# Patient Record
Sex: FEMALE | Race: WHITE | NOT HISPANIC OR LATINO | URBAN - METROPOLITAN AREA
[De-identification: names, ages, dates, MRNs, and addresses within clinical notes are randomized per-mention and may not be internally consistent; named-entity substitution may affect disease eponyms.]

---

## 2021-02-19 ENCOUNTER — OUTPATIENT (OUTPATIENT)
Dept: OUTPATIENT SERVICES | Facility: HOSPITAL | Age: 56
LOS: 1 days | Discharge: HOME | End: 2021-02-19

## 2021-02-19 DIAGNOSIS — Z02.9 ENCOUNTER FOR ADMINISTRATIVE EXAMINATIONS, UNSPECIFIED: ICD-10-CM

## 2021-03-02 ENCOUNTER — OUTPATIENT (OUTPATIENT)
Dept: OUTPATIENT SERVICES | Facility: HOSPITAL | Age: 56
LOS: 1 days | Discharge: HOME | End: 2021-03-02
Payer: COMMERCIAL

## 2021-03-02 DIAGNOSIS — R92.8 OTHER ABNORMAL AND INCONCLUSIVE FINDINGS ON DIAGNOSTIC IMAGING OF BREAST: ICD-10-CM

## 2021-03-02 PROCEDURE — 76642 ULTRASOUND BREAST LIMITED: CPT | Mod: 26,50

## 2021-03-02 PROCEDURE — 77066 DX MAMMO INCL CAD BI: CPT | Mod: 26

## 2021-03-02 PROCEDURE — G0279: CPT | Mod: 26

## 2021-03-15 ENCOUNTER — OUTPATIENT (OUTPATIENT)
Dept: OUTPATIENT SERVICES | Facility: HOSPITAL | Age: 56
LOS: 1 days | Discharge: HOME | End: 2021-03-15
Payer: COMMERCIAL

## 2021-03-15 ENCOUNTER — RESULT REVIEW (OUTPATIENT)
Age: 56
End: 2021-03-15

## 2021-03-15 DIAGNOSIS — N64.9 DISORDER OF BREAST, UNSPECIFIED: ICD-10-CM

## 2021-03-15 PROCEDURE — 19085 BX BREAST 1ST LESION MR IMAG: CPT | Mod: LT

## 2021-03-15 PROCEDURE — 19086 BX BREAST ADD LESION MR IMAG: CPT | Mod: RT

## 2021-03-15 PROCEDURE — 19082 BX BREAST ADD LESION STRTCTC: CPT | Mod: RT

## 2021-03-15 PROCEDURE — 19081 BX BREAST 1ST LESION STRTCTC: CPT | Mod: LT

## 2021-03-15 PROCEDURE — 88305 TISSUE EXAM BY PATHOLOGIST: CPT | Mod: 26

## 2021-03-16 LAB — SURGICAL PATHOLOGY STUDY: SIGNIFICANT CHANGE UP

## 2021-04-06 PROBLEM — Z00.00 ENCOUNTER FOR PREVENTIVE HEALTH EXAMINATION: Status: ACTIVE | Noted: 2021-04-06

## 2021-05-08 PROBLEM — Z86.79 HISTORY OF HYPERTENSION: Status: RESOLVED | Noted: 2021-05-08 | Resolved: 2021-05-08

## 2021-05-08 PROBLEM — Z87.09 HISTORY OF ASTHMA: Status: RESOLVED | Noted: 2021-05-08 | Resolved: 2021-05-08

## 2021-05-08 PROBLEM — Z80.3 FAMILY HISTORY OF BREAST CANCER: Status: ACTIVE | Noted: 2021-05-08

## 2021-05-08 PROBLEM — Z78.9 USES ALCOHOL OCCASIONALLY: Status: ACTIVE | Noted: 2021-05-08

## 2021-05-08 RX ORDER — LOSARTAN POTASSIUM 100 MG/1
TABLET, FILM COATED ORAL
Refills: 0 | Status: ACTIVE | COMMUNITY

## 2021-05-08 RX ORDER — FLUTICASONE FUROATE AND VILANTEROL TRIFENATATE 100; 25 UG/1; UG/1
100-25 POWDER RESPIRATORY (INHALATION)
Refills: 0 | Status: ACTIVE | COMMUNITY

## 2021-05-10 ENCOUNTER — APPOINTMENT (OUTPATIENT)
Dept: BREAST CENTER | Facility: CLINIC | Age: 56
End: 2021-05-10
Payer: COMMERCIAL

## 2021-05-10 VITALS
WEIGHT: 180 LBS | TEMPERATURE: 98.7 F | SYSTOLIC BLOOD PRESSURE: 124 MMHG | HEIGHT: 60 IN | BODY MASS INDEX: 35.34 KG/M2 | DIASTOLIC BLOOD PRESSURE: 78 MMHG

## 2021-05-10 DIAGNOSIS — Z86.79 PERSONAL HISTORY OF OTHER DISEASES OF THE CIRCULATORY SYSTEM: ICD-10-CM

## 2021-05-10 DIAGNOSIS — Z87.09 PERSONAL HISTORY OF OTHER DISEASES OF THE RESPIRATORY SYSTEM: ICD-10-CM

## 2021-05-10 DIAGNOSIS — Z78.9 OTHER SPECIFIED HEALTH STATUS: ICD-10-CM

## 2021-05-10 DIAGNOSIS — Z80.3 FAMILY HISTORY OF MALIGNANT NEOPLASM OF BREAST: ICD-10-CM

## 2021-05-10 PROCEDURE — 99203 OFFICE O/P NEW LOW 30 MIN: CPT

## 2021-05-10 PROCEDURE — 99072 ADDL SUPL MATRL&STAF TM PHE: CPT

## 2021-05-10 NOTE — DATA REVIEWED
[FreeTextEntry1] : EXAM: MG MAMMO DIAG W MARJORIE BI#\par EXAM: MG US BREAST LIMITED BI\par \par \par PROCEDURE DATE: 03/02/2021\par \par \par \par INTERPRETATION: Clinical History / Reason for exam: 55 years old patient for bilateral callback following screening mammogram at an outside facility on 1/29/2021. An asymmetry was noted in the central aspect of the right breast on the CC image only and a 3 mm nodule in the upper inner left breast for which additional imaging was suggested.\par \par Last CBE: 1 month ago\par \par FAMILY HISTORY OF BREAST CANCER: Maternal grandmother at age 50 and mother at age 30 recommended\par \par TECHNIQUE: Bilateral CC spot compression images including full 90 degrees images with Tomosynthesis. Spot compression magnification images of the left breast in CC and 90 degrees projections. CAD was also utilized.\par \par COMPARISON: 1/29/2021 through 1/25/2013. Outside tomographic images are not available.\par \par BREAST COMPOSITION: The breasts are heterogeneously dense, which may obscure small masses.\par \par FINDINGS:\par \par MAMMOGRAM:\par There is a persistent 8 mm asymmetry in the right central to slightly inner breast, projecting in the lower breast on the Tomosynthesis slices.\par \par The questioned nodule in the left medial breast partially effaced on the additional spot compression images. 2 adjacent 3 to 4 mm nodular asymmetries are seen in the left medial breast with a few associated punctate calcifications. The findings are not very distinct on the lateral routine and magnification images.\par \par BREAST ULTRASOUND:\par Targeted bilateral breast ultrasound was performed. Attention areas of mammographic findings.\par \par No discrete suspicious solid cystic mass is noted.\par \par IMPRESSION:\par \par Additional right mammographic imaging demonstrates a sonographically occult 8 mm asymmetry in the central to slightly medial inferior breast. Stereotactic guided biopsy is suggested.\par \par The above finding may correspond to the mass noted in the right breast at 6:00 axis on MRI for which MRI guided biopsy is planned. The patient should undergo bilateral MRI guided biopsies as previously suggested. On the postprocedure mammogram, the position of the biopsy marker to be correlated with the mammographic asymmetry. If the MRI biopsy site does not correspond to the mammographic asymmetry or the MRI guided biopsy could not be performed due to nonvisualization, then the patient should be scheduled for stereotactic guided right breast biopsy.\par \par Additional left mammographic imaging demonstrates a couple of 3 to 4 mm probably benign sonographically occult adjacent nodular asymmetries with a few associated benign appearing punctate calcifications in the left medial breast. Short-term follow-up left mammogram is suggested in 6 months for stability. However, patient is requesting biopsy for definitive diagnosis. Stereotactic guided biopsy may be performed for one of the 2 adjacent asymmetries as they exhibit similar morphology. Management of the adjacent second asymmetry to be based upon the biopsy result.\par \par Recommendation: Stereotactic guided biopsy. The patient should undergo bilateral MRI guided biopsies first as discussed above. Stereotactic guided biopsy of the right breast to be performed if needed following the MRI guided biopsy. The patient is requesting left breast stereotactic guided biopsy as well.\par \par BI-RADS Category 4: Suspicious\par \par The findings and recommendations were discussed with the patient and her . The patient will be given appointments for the MRI guided and stereotactic guided biopsies.\par \par \par \par \par DEL DE LOS SANTOS MD; Attending Radiologist\par This document has been electronically signed. Mar 3 2021 3:45PM\par \par EXAM: MR BX BRST 1ST LT SISC\par EXAM: MR BX BRST 1ST RT SISC\par \par *** ADDENDUM 03/17/2021 ***\par \par Postprocedure patient follow-up and Pathology result:\par \par -Diagnosis:\par 1. BREAST, LEFT LATERAL ENHANCING MASS, MRI GUIDED NEEDLE CORE\par BIOPSIES:\par - BENIGN EARLY ATROPHIC FATTY BREAST TISSUE WITH PROLIFERATIVE\par TYPE FIBROCYSTIC CHANGES ASSOCIATED WITH RARE MICROCALCIFICATIONS\par PRESENT IN BENIGN DUCTULES.\par \par 2. BREAST, RIGHT MEDIAL ENHANCING MASS, MRI GUIDED NEEDLE CORE\par BIOPSIES:\par - BENIGN EARLY ATROPHIC FATTY BREAST TISSUE WITHOUT\par -The pathology results are concordant with the imaging findings.\par \par -Recommendation: Surgical consultation for the right breast intraductal papilloma.\par \par -No significant delayed complications.\par \par -Results were discussed with patient on 3/17/2021 at 11:[address] Dr. Richard via telephone with read back.\par \par \par \par *** END OF ADDENDUM 03/17/2021 ***\par \par \par \par PROCEDURE DATE: 03/15/2021\par \par \par \par \par INTERPRETATION: Clinical History / Reason for exam: MRI guided biopsy of the both breasts.\par \par Clinical indication: Bilateral breast masses.\par \par The procedure, its risks, benefits, and alternatives were explained to the patient, who expressed understanding and gave informed written and verbal consent, including consent for intravenous gadolinium. A time-out, which included the patient's full name, date of birth, description of the expected procedure and procedure site, was performed immediately before the procedure.\par \par Technique/Findings: MR imaging of the both breasts was performed using a 1.5 felecia GE magnet, dedicated 8 channel breast coil and [employer] platform. 7.5 cc of intravenous contrast were administered. IV access was obtained on site. The examination was performed with a biopsy grid in place. Sequences include sagittal dynamic fat suppressed pre and post contrast gradient echo imaging.\par \par Right breast medial:\par Using the usual sterile technique with 1% lidocaine as local anesthesia, a small dermatotomy was made in the skin. Under MR guidance, using a 9 gauge vacuum-assisted device, the previously described area in the right breast was sampled, with 6 core biopsies obtained. Post biopsy imaging in the sagittal plane demonstrated a hematoma at the biopsy site. A Hologic Cork biopsy clip was placed in the biopsy cavity.\par \par Left breast lateral:\par Using the usual sterile technique with 1% lidocaine as local anesthesia, a small dermatotomy was made in the skin. Under MR guidance, using a 9 gauge vacuum-assisted device, the previously described area in the left breast was sampled, with 6 core biopsies obtained. Post biopsy imaging in the sagittal plane demonstrated a hematoma at the biopsy site. A Hologic Cork biopsy clip was placed in the biopsy cavity.\par \par Hemostasis was maintained with manual pressure. A sterile dressing was applied.\par \par A bilateral mammogram was performed:\par VIEWS: CC and ML views of the both breasts.\par BREAST COMPOSITION: The breasts are heterogeneously dense, which may obscure small masses.\par FINDINGS: The biopsy clip placed during the MRI guided biopsy is in the anticipated location in both breasts.\par FINAL ASSESSMENT Category: Post Procedure Mammogram for Marker Placement\par \par The patient tolerated the procedure well and was discharged home in stable condition, with written discharge instructions.\par \par \par IMPRESSION:\par \par Successful MRI guided biopsy of the both breasts.\par \par Pathology: Pending, to be reported as an addendum.\par ***Please see the addendum at the top of this report. It may contain additional important information or changes.****\par \par \par \par MARIA L RICHARD DO; Attending Radiologist\par This document has been electronically signed. Mar 15 2021 3:27PM\par Addend:MARIA L RICHARD DO; Attending Radiologist\par This addendum was electronically signed on: Mar 17 2021 11:51AM\par \par EXAM: MG STEREO BX 1ST LT SISC\par EXAM: MG STEREO BX 1ST RT SISC\par \par *** ADDENDUM 03/17/2021 ***\par \par Postprocedure patient follow-up and Pathology result:\par \par -Diagnosis:\par 1. BREAST, RIGHT CENTRAL MASS, STEREOTACTIC CORE BIOPSIES:\par - INTRADUCTAL PAPILLOMA ASSOCIATED WITH EXTENSIVE\par CYSTIC/PAPILLARY APOCRINE METAPLASIA AND FOCAL FLORID DUCT\par HYPERPLASIA.\par - BENIGN ATROPHIC FATTY BREAST TISSUE WITH PROLIFERATIVE TYPE\par FIBROCYSTIC CHANGES ASSOCIATED WITH MICROCALCIFICATIONS.\par \par 2. BREAST, LEFT MEDIAL MASS, STEREOTACTIC CORE BIOPSIES:\par - BENIGN EARLY ATROPHIC FATTY BREAST TISSUE WITH PROLIFERATIVE\par TYPE FIBROCYSTIC CHANGES INCLUDING FLORID DUCT\par HYPERPLASIA AND CYSTIC/PAPILLARY APOCRINE METAPLASIA ASSOCIATED\par WITH CALCIUM OXALATE CRYSTALS.\par -The pathology results are concordant with the imaging findings.\par \par -Recommendation: Surgical consultation for the right breast in the ductal papilloma recommended.\par \par -No significant delayed complications.\par \par -Results were discussed with patient on 3/17/2021 at 11:04 AM Dr. Richard via telephone with read back.\par \par \par \par *** END OF ADDENDUM 03/17/2021 ***\par \par \par \par PROCEDURE DATE: 03/15/2021\par \par \par \par INTERPRETATION: Clinical History / Reason for exam: Bilateral breast masses.\par \par Procedures: both breasts stereotactic vacuum assisted core biopsy and post clip placement two view both breasts mammogram.\par \par Previous films and reports were reviewed. The procedure, its risks, benefits, and alternatives were explained to the patient, who expressed understanding and gave informed written and verbal consent. A time-out, which included the patient's full name, date of birth, description of the expected procedure and procedure site, was performed immediately before the procedure.\par \par \par Right breast central:\par A superior approach was selected for the procedure. Using the usual sterile technique and stereotactic guidance, the previously described mass in the right breast central were biopsied using a 9 gauge vacuum-assisted device. A single pass was made and 6 samples were collected. Specimen radiography demonstrated the calcifications to be contained within the specimen. A ([email or web address] top-hat) localizing clip was then placed into the biopsy cavity. Hemostasis was obtained and a sterile dressing was applied.\par \par Left breast medial:\par A superior approach was selected for the procedure. Using the usual sterile technique and stereotactic guidance, the previously described mass in the left breast medial were biopsied using a 9 gauge vacuum-assisted device. A single pass was made and 6 samples were collected. Specimen radiography demonstrated the calcifications to be contained within the specimen. A ([employer] top-hat) localizing clip was then placed into the biopsy cavity. Hemostasis was obtained and a sterile dressing was applied.\par \par A bilateral mammogram performed in the CC and lateral projections demonstrates biopsy marker in appropriate position.\par \par The patient tolerated the procedure well and left the department in good condition. Written discharge instructions were discussed and provided to the patient.\par \par IMPRESSION:\par \par Successful stereotactic guided biopsy of the both breasts.\par \par \par Pathology: Pending, to be reported as an addendum.\par ***Please see the addendum at the top of this report. It may contain additional important information or changes.****\par \par \par \par MARIA L RICHARD DO; Attending Radiologist\par This document has been electronically signed. Mar 15 2021 3:24PM\par Addend:MARIA L RICHARD DO; Attending Radiologist\yuan This addendum was electronically signed on: Mar 17 2021 11:52AM

## 2021-05-10 NOTE — ASSESSMENT
[FreeTextEntry1] : Rosemarie is a 55 F with a R breast intraductal papilloma and benign high risk disease. \par \par On exam, I was not able to palpate any suspicious abnormalities within either breast. \par \par Her most recent imaging was a b/l screening mammogram and b/l dx mammogram and US On 1/29/2021 and 3/2/2021 respectively which revealed in her right breast, central to slightly inner breast, an 8 mm biopsy proven intraductal papilloma, and in her left breast, medial, biopsy proven benign nodular asymmetry with 2 adjacent 3-4 mm nodular asymmetries. \par \par Intraductal papillomas without atypia are considered fibroproliferative lesions without atypia.  Patients with these lesions were found to have a slightly increased relative risk of breast cancer compared to the reference population.  However the lesions themselves do not have any malignant potential. \par \par Although newer studies regarding the upgrade rate (to cancer) ranges from 0-14% on surgical excision, with pathologic/radiologic concordance, older studies found a higher upgrade rate.  I have recommended surgical excision for this reason.  Because it is not readily palpable, I will have her undergo a preoperative radiofrequency tag localization.  \par \par The risks and benefits of the procedure were explained to the patient, including bleeding, infection, seroma/hematoma formation, and possible re-operation if the surgical excision yields an upgrade to cancer with positive margins. Informed consent was obtained today.\par \par We discussed dense breasts.  Increasing breast density has been found to increase ones risk of breast cancer, but at this time, there is no clear indication for additional imaging in this setting, as both US and MRI have not been found to improve survival.  One can consider bilateral screening US.  However, out of 1000 women screened, the use of routine US will only identify an additional 3-5 cancers.  The use of US was found to increase the likelihood of undergoing more imaging and more biopsies.  She does have dense breasts.  We have decided to proceed with screening bilateral breast US at this time.  This will be scheduled with her next screening mammogram.\par \par In regards to her family history of breast cancer in her mother, maternal grandmother and sister, her risk assessment is as follows: \par RISK ASSESSMENT: \par Jaclyn\par 5yr -- 5.2%\par lifetime -- 31.5%\par \par TC v6\par 5yr -- 5.9%\par lifetime -- 25.8%\par \par This puts her in the high risk category for breast cancer because she has a lifetime risk of >20%.  She qualifies for annual screening MRIs which would be done in an alternating fashion with her screening mammograms such that an imaging study and clinical breast exam would be performed every 6 months.  The use of MRIs have not been shown to prolong survival, however out of 1000 women screened, an additional 14-15 cancers will be identified.  The use of MRIs, has, however, been shown to increase the number of procedures and additional imaging because although it is a very sensitive test, it is not as specific.  This was discussed with the patient and she would like to proceed with screening MRIs.  Her next breast MRI will be due in 1 year. \par \par In addition, because her 5yr risk exceeds 1.7%, she also qualifies for chemopreventative medications.  For premenopausal women, we can offer tamoxifen 20 mg daily for 5 years and for postmenopausal women, we can offer either tamoxifen or raloxifene x 5 years.  This medication has been found to reduce the risk of breast cancer by about 50%.  The risks associated with these medications include thromboembolic disease, uterine cancer, and cataracts, as well as some side effects including hot flashes, vasomotor symptoms, weight gain or hair thinning/loss.  This will be discussed with her post operatively. \par \par All of her questions were answered.  She knows to call with any further questions or concerns. \par \par PLAN:\par -OR: RIGHT BREAST WIDE LOCAL EXCISION WITH RF LOCALIZATION \par -DIAGNOSIS: RIGHT BREAST INTRADUCTAL PAPILLOMA \par -f/up after\par

## 2021-05-10 NOTE — PAST MEDICAL HISTORY
[Menarche Age ____] : age at menarche was [unfilled] [Menopause Age____] : age at menopause was [unfilled] [Total Preg ___] : G[unfilled] [Live Births ___] : P[unfilled]  [Age At Live Birth ___] : Age at live birth: [unfilled] [History of Hormone Replacement Treatment] : has no history of hormone replacement treatment [FreeTextEntry5] :   [FreeTextEntry6] : denies [FreeTextEntry7] : yes in past  [FreeTextEntry8] :  denies

## 2021-05-10 NOTE — REVIEW OF SYSTEMS
[Negative] : Heme/Lymph [As Noted in HPI] : as noted in HPI [Abn Vaginal Bleeding] : unexplained vaginal bleeding [Skin Lesions] : no skin lesions [Skin Wound] : no skin wound [Breast Pain] : no breast pain [Breast Lump] : no breast lump [Hot Flashes] : hot flashes

## 2021-05-10 NOTE — PHYSICAL EXAM
[Normocephalic] : normocephalic [Atraumatic] : atraumatic [EOMI] : extra ocular movement intact [No Supraclavicular Adenopathy] : no supraclavicular adenopathy [No Cervical Adenopathy] : no cervical adenopathy [No dominant masses] : no dominant masses in right breast  [No dominant masses] : no dominant masses left breast [No Nipple Retraction] : no left nipple retraction [No Nipple Discharge] : no left nipple discharge [No Axillary Lymphadenopathy] : no left axillary lymphadenopathy [Soft] : abdomen soft [Not Tender] : non-tender [No Edema] : no edema [No Rashes] : no rashes [No Ulceration] : no ulceration [de-identified] : no suspicious abnormalities were palpated within either breast  [de-identified] : her right breast intraductal papilloma was not readily palpable

## 2021-05-10 NOTE — HISTORY OF PRESENT ILLNESS
[FreeTextEntry1] : Rosemarie is a 55 perimenopausal F with a R intraductal papilloma. \par \par She has no breast related complaints at this time.  She denies any breast pain, has not palpated any new palpable masses in either breast and denies any nipple discharge or retraction. \par \par Her work up was as follows: \par 2021 -- b/l screening mammogram \par \par -breast MRI \par \par 3/2/2021 -- b/l dx mammogram and US \par -heterogenously dense breasts\par -R: central/slightly inner breast, persistent 8 mm mass --> BIOPSY \par -L: questioned nodule in medial L partially effaces on spot compression; 2 adjacent 3-4 mm nodular asymmetries with punctate calcifications --> BIRADS 3 (per patient request, will be biopsied) \par b/l US \par -no suspicious solid or cystic masses \par BIRADS 4 \par \par 3/15/2021 -- b/l MR guided biopsies \par 1. L lateral enhancing mass (cork clip) \par -benign proliferative type FC changes \par \par 2. R medial enhancing mass (cork) \par -benign breast tissue \par \par 3/17/2021 -- b/l stereotactic guided biopsies \par 1. R central mass (tophat clip) \par -intraductal papilloma \par \par 2. L medial mass (tophat) \par -proliferative type FC changes \par \par HISTORICAL RISK FACTORS: \par -4 prior breast biopsies as above, no prior surgeries \par -family history of breast cancer in her mother, sister and maternal grandmother, her sister was found to be BRCA positive, and Rosemarie was found to be BRCA neg (unsure if this was BRCA 1 or 2)\par -, age at first live birth was 34 \par -prior OCP use \par -no gyn surgeries\par \par RISK ASSESSMENT: \par Jaclyn\par 5yr -- 5.2%\par lifetime -- 31.5%\par \par TC v6\par 5yr -- 5.9%\par lifetime -- 25.8%

## 2021-05-12 ENCOUNTER — NON-APPOINTMENT (OUTPATIENT)
Age: 56
End: 2021-05-12

## 2021-06-28 ENCOUNTER — OUTPATIENT (OUTPATIENT)
Dept: OUTPATIENT SERVICES | Facility: HOSPITAL | Age: 56
LOS: 1 days | Discharge: HOME | End: 2021-06-28
Payer: COMMERCIAL

## 2021-06-28 VITALS
HEART RATE: 96 BPM | HEIGHT: 60 IN | OXYGEN SATURATION: 97 % | SYSTOLIC BLOOD PRESSURE: 136 MMHG | TEMPERATURE: 98 F | RESPIRATION RATE: 18 BRPM | DIASTOLIC BLOOD PRESSURE: 98 MMHG | WEIGHT: 155.43 LBS

## 2021-06-28 DIAGNOSIS — Z01.818 ENCOUNTER FOR OTHER PREPROCEDURAL EXAMINATION: ICD-10-CM

## 2021-06-28 DIAGNOSIS — Z98.891 HISTORY OF UTERINE SCAR FROM PREVIOUS SURGERY: Chronic | ICD-10-CM

## 2021-06-28 DIAGNOSIS — D24.1 BENIGN NEOPLASM OF RIGHT BREAST: ICD-10-CM

## 2021-06-28 DIAGNOSIS — Z90.49 ACQUIRED ABSENCE OF OTHER SPECIFIED PARTS OF DIGESTIVE TRACT: Chronic | ICD-10-CM

## 2021-06-28 LAB
ALBUMIN SERPL ELPH-MCNC: 4.7 G/DL — SIGNIFICANT CHANGE UP (ref 3.5–5.2)
ALP SERPL-CCNC: 93 U/L — SIGNIFICANT CHANGE UP (ref 30–115)
ALT FLD-CCNC: 30 U/L — SIGNIFICANT CHANGE UP (ref 0–41)
ANION GAP SERPL CALC-SCNC: 11 MMOL/L — SIGNIFICANT CHANGE UP (ref 7–14)
APTT BLD: 43.5 SEC — HIGH (ref 27–39.2)
AST SERPL-CCNC: 23 U/L — SIGNIFICANT CHANGE UP (ref 0–41)
BASOPHILS # BLD AUTO: 0.06 K/UL — SIGNIFICANT CHANGE UP (ref 0–0.2)
BASOPHILS NFR BLD AUTO: 0.8 % — SIGNIFICANT CHANGE UP (ref 0–1)
BILIRUB SERPL-MCNC: 0.5 MG/DL — SIGNIFICANT CHANGE UP (ref 0.2–1.2)
BUN SERPL-MCNC: 14 MG/DL — SIGNIFICANT CHANGE UP (ref 10–20)
CALCIUM SERPL-MCNC: 10 MG/DL — SIGNIFICANT CHANGE UP (ref 8.5–10.1)
CHLORIDE SERPL-SCNC: 103 MMOL/L — SIGNIFICANT CHANGE UP (ref 98–110)
CO2 SERPL-SCNC: 27 MMOL/L — SIGNIFICANT CHANGE UP (ref 17–32)
CREAT SERPL-MCNC: 0.9 MG/DL — SIGNIFICANT CHANGE UP (ref 0.7–1.5)
EOSINOPHIL # BLD AUTO: 0.26 K/UL — SIGNIFICANT CHANGE UP (ref 0–0.7)
EOSINOPHIL NFR BLD AUTO: 3.5 % — SIGNIFICANT CHANGE UP (ref 0–8)
GLUCOSE SERPL-MCNC: 77 MG/DL — SIGNIFICANT CHANGE UP (ref 70–99)
HCT VFR BLD CALC: 46.6 % — SIGNIFICANT CHANGE UP (ref 37–47)
HGB BLD-MCNC: 15 G/DL — SIGNIFICANT CHANGE UP (ref 12–16)
IMM GRANULOCYTES NFR BLD AUTO: 0.4 % — HIGH (ref 0.1–0.3)
INR BLD: 0.98 RATIO — SIGNIFICANT CHANGE UP (ref 0.65–1.3)
LYMPHOCYTES # BLD AUTO: 2.07 K/UL — SIGNIFICANT CHANGE UP (ref 1.2–3.4)
LYMPHOCYTES # BLD AUTO: 27.8 % — SIGNIFICANT CHANGE UP (ref 20.5–51.1)
MCHC RBC-ENTMCNC: 28.1 PG — SIGNIFICANT CHANGE UP (ref 27–31)
MCHC RBC-ENTMCNC: 32.2 G/DL — SIGNIFICANT CHANGE UP (ref 32–37)
MCV RBC AUTO: 87.3 FL — SIGNIFICANT CHANGE UP (ref 81–99)
MONOCYTES # BLD AUTO: 0.67 K/UL — HIGH (ref 0.1–0.6)
MONOCYTES NFR BLD AUTO: 9 % — SIGNIFICANT CHANGE UP (ref 1.7–9.3)
NEUTROPHILS # BLD AUTO: 4.36 K/UL — SIGNIFICANT CHANGE UP (ref 1.4–6.5)
NEUTROPHILS NFR BLD AUTO: 58.5 % — SIGNIFICANT CHANGE UP (ref 42.2–75.2)
NRBC # BLD: 0 /100 WBCS — SIGNIFICANT CHANGE UP (ref 0–0)
PLATELET # BLD AUTO: 285 K/UL — SIGNIFICANT CHANGE UP (ref 130–400)
POTASSIUM SERPL-MCNC: 4.1 MMOL/L — SIGNIFICANT CHANGE UP (ref 3.5–5)
POTASSIUM SERPL-SCNC: 4.1 MMOL/L — SIGNIFICANT CHANGE UP (ref 3.5–5)
PROT SERPL-MCNC: 7.2 G/DL — SIGNIFICANT CHANGE UP (ref 6–8)
PROTHROM AB SERPL-ACNC: 11.3 SEC — SIGNIFICANT CHANGE UP (ref 9.95–12.87)
RBC # BLD: 5.34 M/UL — SIGNIFICANT CHANGE UP (ref 4.2–5.4)
RBC # FLD: 13.4 % — SIGNIFICANT CHANGE UP (ref 11.5–14.5)
SODIUM SERPL-SCNC: 141 MMOL/L — SIGNIFICANT CHANGE UP (ref 135–146)
WBC # BLD: 7.45 K/UL — SIGNIFICANT CHANGE UP (ref 4.8–10.8)
WBC # FLD AUTO: 7.45 K/UL — SIGNIFICANT CHANGE UP (ref 4.8–10.8)

## 2021-06-28 PROCEDURE — 93010 ELECTROCARDIOGRAM REPORT: CPT

## 2021-06-28 NOTE — H&P PST ADULT - NSICDXPASTMEDICALHX_GEN_ALL_CORE_FT
PAST MEDICAL HISTORY:  Asthma     COVID-19 vaccine series completed     Hypertension     Seasonal allergies

## 2021-06-28 NOTE — H&P PST ADULT - NSICDXFAMILYHX_GEN_ALL_CORE_FT
FAMILY HISTORY:  Mother  Still living? Unknown  FH: breast cancer, Age at diagnosis: Age Unknown    Sibling  Still living? Yes, Estimated age: Age Unknown  FH: BRCA gene positive, Age at diagnosis: Age Unknown    Grandparent  Still living? No  FH: breast cancer, Age at diagnosis: Age Unknown

## 2021-06-28 NOTE — H&P PST ADULT - HISTORY OF PRESENT ILLNESS
The patient is a 55 year old female with a right breast mass noted on her recent mammogram. Today, the patient presents to [facility] for preop evaluation in preparation for scheduled surgery/procedure. The patient denies chest pains, SOB, palpitations, cough or dysuria. Able to walk 1-2 FOS without HENAO, CP or palpitations.    Anesthesia Alert  NO--Difficult Airway  NO--History of neck surgery or radiation  NO--Limited ROM of neck  NO--History of Malignant hyperthermia  NO--No personal or family history of Pseudocholinesterase deficiency.  NO--Prior Anesthesia Complication  NO--Latex Allergy  NO--Loose teeth  NO--History of Rheumatoid Arthritis  NO--HILDA  NO--Other_____

## 2021-07-07 ENCOUNTER — RESULT REVIEW (OUTPATIENT)
Age: 56
End: 2021-07-07

## 2021-07-07 ENCOUNTER — OUTPATIENT (OUTPATIENT)
Dept: OUTPATIENT SERVICES | Facility: HOSPITAL | Age: 56
LOS: 1 days | Discharge: HOME | End: 2021-07-07
Payer: COMMERCIAL

## 2021-07-07 DIAGNOSIS — Z98.891 HISTORY OF UTERINE SCAR FROM PREVIOUS SURGERY: Chronic | ICD-10-CM

## 2021-07-07 DIAGNOSIS — Z90.49 ACQUIRED ABSENCE OF OTHER SPECIFIED PARTS OF DIGESTIVE TRACT: Chronic | ICD-10-CM

## 2021-07-07 PROBLEM — Z92.29 PERSONAL HISTORY OF OTHER DRUG THERAPY: Chronic | Status: ACTIVE | Noted: 2021-06-28

## 2021-07-07 PROBLEM — J30.2 OTHER SEASONAL ALLERGIC RHINITIS: Chronic | Status: ACTIVE | Noted: 2021-06-28

## 2021-07-07 PROBLEM — J45.909 UNSPECIFIED ASTHMA, UNCOMPLICATED: Chronic | Status: ACTIVE | Noted: 2021-06-28

## 2021-07-07 PROBLEM — I10 ESSENTIAL (PRIMARY) HYPERTENSION: Chronic | Status: ACTIVE | Noted: 2021-06-28

## 2021-07-07 PROCEDURE — 19281 PERQ DEVICE BREAST 1ST IMAG: CPT | Mod: RT

## 2021-07-12 ENCOUNTER — TRANSCRIPTION ENCOUNTER (OUTPATIENT)
Age: 56
End: 2021-07-12

## 2021-07-12 DIAGNOSIS — N64.89 OTHER SPECIFIED DISORDERS OF BREAST: ICD-10-CM

## 2021-07-13 ENCOUNTER — APPOINTMENT (OUTPATIENT)
Dept: BREAST CENTER | Facility: AMBULATORY SURGERY CENTER | Age: 56
End: 2021-07-13
Payer: COMMERCIAL

## 2021-07-13 ENCOUNTER — RESULT REVIEW (OUTPATIENT)
Age: 56
End: 2021-07-13

## 2021-07-13 ENCOUNTER — OUTPATIENT (OUTPATIENT)
Dept: OUTPATIENT SERVICES | Facility: HOSPITAL | Age: 56
LOS: 1 days | Discharge: HOME | End: 2021-07-13
Payer: COMMERCIAL

## 2021-07-13 VITALS
RESPIRATION RATE: 12 BRPM | SYSTOLIC BLOOD PRESSURE: 112 MMHG | HEART RATE: 60 BPM | OXYGEN SATURATION: 100 % | DIASTOLIC BLOOD PRESSURE: 68 MMHG

## 2021-07-13 VITALS
OXYGEN SATURATION: 99 % | TEMPERATURE: 99 F | HEIGHT: 60 IN | SYSTOLIC BLOOD PRESSURE: 135 MMHG | HEART RATE: 81 BPM | RESPIRATION RATE: 18 BRPM | WEIGHT: 155.43 LBS | DIASTOLIC BLOOD PRESSURE: 92 MMHG

## 2021-07-13 DIAGNOSIS — Z98.891 HISTORY OF UTERINE SCAR FROM PREVIOUS SURGERY: Chronic | ICD-10-CM

## 2021-07-13 DIAGNOSIS — Z90.49 ACQUIRED ABSENCE OF OTHER SPECIFIED PARTS OF DIGESTIVE TRACT: Chronic | ICD-10-CM

## 2021-07-13 PROCEDURE — 88305 TISSUE EXAM BY PATHOLOGIST: CPT | Mod: 26

## 2021-07-13 PROCEDURE — 19125 EXCISION BREAST LESION: CPT | Mod: RT

## 2021-07-13 RX ORDER — ONDANSETRON 8 MG/1
4 TABLET, FILM COATED ORAL ONCE
Refills: 0 | Status: DISCONTINUED | OUTPATIENT
Start: 2021-07-13 | End: 2021-07-28

## 2021-07-13 RX ORDER — HYDROMORPHONE HYDROCHLORIDE 2 MG/ML
1 INJECTION INTRAMUSCULAR; INTRAVENOUS; SUBCUTANEOUS
Refills: 0 | Status: DISCONTINUED | OUTPATIENT
Start: 2021-07-13 | End: 2021-07-13

## 2021-07-13 RX ORDER — FLUTICASONE FUROATE AND VILANTEROL TRIFENATATE 100; 25 UG/1; UG/1
1 POWDER RESPIRATORY (INHALATION)
Qty: 0 | Refills: 0 | DISCHARGE

## 2021-07-13 RX ORDER — LOSARTAN POTASSIUM 100 MG/1
1 TABLET, FILM COATED ORAL
Qty: 0 | Refills: 0 | DISCHARGE

## 2021-07-13 RX ORDER — ACETAMINOPHEN 500 MG
975 TABLET ORAL ONCE
Refills: 0 | Status: COMPLETED | OUTPATIENT
Start: 2021-07-13 | End: 2021-07-13

## 2021-07-13 RX ORDER — IBUPROFEN 200 MG
1 TABLET ORAL
Qty: 15 | Refills: 0
Start: 2021-07-13 | End: 2021-07-17

## 2021-07-13 RX ORDER — LORATADINE 10 MG/1
1 TABLET ORAL
Qty: 0 | Refills: 0 | DISCHARGE

## 2021-07-13 RX ORDER — OXYCODONE HYDROCHLORIDE 5 MG/1
5 TABLET ORAL ONCE
Refills: 0 | Status: DISCONTINUED | OUTPATIENT
Start: 2021-07-13 | End: 2021-07-13

## 2021-07-13 RX ORDER — HYDROMORPHONE HYDROCHLORIDE 2 MG/ML
0.5 INJECTION INTRAMUSCULAR; INTRAVENOUS; SUBCUTANEOUS
Refills: 0 | Status: DISCONTINUED | OUTPATIENT
Start: 2021-07-13 | End: 2021-07-13

## 2021-07-13 RX ORDER — SODIUM CHLORIDE 9 MG/ML
1000 INJECTION, SOLUTION INTRAVENOUS
Refills: 0 | Status: DISCONTINUED | OUTPATIENT
Start: 2021-07-13 | End: 2021-07-28

## 2021-07-13 RX ORDER — CELECOXIB 200 MG/1
200 CAPSULE ORAL ONCE
Refills: 0 | Status: COMPLETED | OUTPATIENT
Start: 2021-07-13 | End: 2021-07-13

## 2021-07-13 RX ADMIN — CELECOXIB 200 MILLIGRAM(S): 200 CAPSULE ORAL at 13:32

## 2021-07-13 RX ADMIN — Medication 975 MILLIGRAM(S): at 13:32

## 2021-07-13 NOTE — ASU DISCHARGE PLAN (ADULT/PEDIATRIC) - CARE PROVIDER_API CALL
Michelle Sims (MD)  Surgery  [address] [facility], [address] [place] [address]  Phone: [phone]  Fax: [phone]  Follow Up Time:

## 2021-07-13 NOTE — ASU DISCHARGE PLAN (ADULT/PEDIATRIC) - ASU DC SPECIAL INSTRUCTIONSFT
Wide local excision with a sentinel lymph node biopsy    What to expect  1. You will have dermabond covering your incision(s). This is skin glue and creates an artificial scar over the wound.     2. A small amount of bruising is normal after surgery.    Wound Care  1. You may shower after 24-48 hrs after surgery.    2. When showering, do not try to scrub the incisions. Do not soak the incision (baths/swimming/hot tubs) for 2 weeks.    3. The dermabond will start to come off after about 2 weeks. Do not try to pick off the edges that have loosened.      Restrictions  1. It is always beneficial to ambulate regularly after surgery    2. Avoid heavy lifting and excessive use of the affected arm (such as weight lifting) for the first two weeks    3. Avoid bras/clothing that directly put pressure on the incision    4. Most home medications can be restarted the day after surgery (24 hours post-op). Specific instructions for blood thinners/aspirin will be given to patient per the medication reconciliation form    5. Do NOT make important decisions or drive while on opioid pain medications    Pain control  1. You will be given a prescription for ibuprofen 600 mg, 1 tablet every 8 hrs as needed for pain. If you still require more medications, you can alternate between Tylenol and motrin.    2. Please take the pain medication with food to decrease GI upset    3. If requiring stronger medications, please call the office at [phone] to speak with a practitioner    Diet  No Restrictions    When to call us:  1. if you develop fevers or chills    2. if the incisions become red, tender and warm to touch    3. if you notice purulent drainage from either incision    4. if you notice that your chest wall is becoming "puffy" or filled with fluid after surgery    Follow up care  1. follow up appointment is usually 1-2 weeks after surgery    2. You will receive final pathology and further instructions during the first follow up visit.

## 2021-07-13 NOTE — CHART NOTE - NSCHARTNOTEFT_GEN_A_CORE
PACU ANESTHESIA ADMISSION NOTE      Procedure: Right breast lumpectomy      Post op diagnosis:  Intraductal papilloma of right breast      __x__  Patent Airway    __x__  Full return of protective reflexes    __x__  Full recovery from anesthesia / back to baseline     Vitals:   T:   97.9        R:  14                BP:    96/51              Sat:   95%                P: 96      Mental Status:  __x__ Awake   _____ Alert   _____ Drowsy   _____ Sedated    Nausea/Vomiting:  ____ NO  ______Yes,   See Post - Op Orders          Pain Scale (0-10):  _____    Treatment: ____ None    ___x_ See Post - Op/PCA Orders    Post - Operative Fluids:   ____ Oral   __x__ See Post - Op Orders    Plan: Discharge:   _x___Home       _____Floor     _____Critical Care    _____  Other:_________________    Comments: Pt tolerated procedure well, no anesthesia related complications. Care of pt endorsed to PACU, report given to PACU RN. Discharge when criteria are met.

## 2021-07-13 NOTE — ASU PREOP CHECKLIST - BP NONINVASIVE SYSTOLIC (MM HG)
I called the patient on July 23 at 2:20 PM.  She states that she has resumed taking naproxen, 500 mg tablets, which had originally been prescribed for hip pain. She is doing reasonably well and is apparently still going to work. I instructed the patient to monitor her symptoms. If her pain persists or worsens, I would wish to see her next week, when she is not scheduled to work. Patient voices understanding. All questions answered.     Jerald Smiley MD 135

## 2021-07-15 DIAGNOSIS — D24.1 BENIGN NEOPLASM OF RIGHT BREAST: ICD-10-CM

## 2021-07-15 DIAGNOSIS — J45.909 UNSPECIFIED ASTHMA, UNCOMPLICATED: ICD-10-CM

## 2021-07-15 DIAGNOSIS — I10 ESSENTIAL (PRIMARY) HYPERTENSION: ICD-10-CM

## 2021-07-19 LAB — SURGICAL PATHOLOGY STUDY: SIGNIFICANT CHANGE UP

## 2021-07-23 ENCOUNTER — APPOINTMENT (OUTPATIENT)
Dept: BREAST CENTER | Facility: CLINIC | Age: 56
End: 2021-07-23
Payer: COMMERCIAL

## 2021-07-23 VITALS — BODY MASS INDEX: 35.34 KG/M2 | WEIGHT: 180 LBS | TEMPERATURE: 98 F | HEIGHT: 60 IN

## 2021-07-23 DIAGNOSIS — D24.1 BENIGN NEOPLASM OF RIGHT BREAST: ICD-10-CM

## 2021-07-23 DIAGNOSIS — Z12.39 ENCOUNTER FOR OTHER SCREENING FOR MALIGNANT NEOPLASM OF BREAST: ICD-10-CM

## 2021-07-23 PROCEDURE — 99024 POSTOP FOLLOW-UP VISIT: CPT

## 2021-07-25 PROBLEM — Z12.39 BREAST CANCER SCREENING, HIGH RISK PATIENT: Status: ACTIVE | Noted: 2021-05-08

## 2021-07-25 NOTE — DATA REVIEWED
[FreeTextEntry1] : [employer] Accession Number : [id number]\par \par LEVENTHAL, ROCHELLE                   2\par \par \par \par Surgical Final Report\par \par \par \par \par Final Diagnosis\par Breast, right central/inferior mass, radio frequency seed\par localized lumpectomy:\par - Intraductal papilloma located adjacent to a healing prior\par biopsy site.\par - Early atrophic fibrofatty breast tissue with proliferative\par type fibrocystic changes including usual type duct hyperplasia,\par stromal fibrosis, sclerosing\par adenosis, apocrine metaplasia, and microcalcifications.\par \par Verified by: Jarett Handley M.D.\par (Electronic Signature)\par Reported on: 07/19/21 15:42 EDT, [facility] [address]\par Phone: [phone]   Fax: [phone]\par _________________________________________________________________\par \par Clinical History\par Right WLE with RF loc\par \par Specimen(s) Submitted\par Right breast mass\par Time obtained: 2:19 pm\par \par Gross Description\par The specimen is received fresh, labeled "right breast mass, radio\par frequency localizer, single anterior, double lateral, triple\par superior" and consists of a fibroadipose tissue mass, weighing 4\par gm and measuring as follows: anterior to posterior 3.5 cm, medial\par to lateral 3 cm, superior to inferior 1 cm. The specimen is inked\par as follows: superior - blue, inferior - green, anterior - red,\par posterior - black, medial - orange and lateral - yellow. Serial\par section reveals yellow white tan cut surface, a clip is found in\par the center of the mass. The specimen is submitted entirely.\par \par Summary of Sections:\par 1A - Anterior margin perpendicular -1\par 1B-1G - Consecutive sections from anterior to posterior -6\par 1H - Posterior margin perpendicular -1\par \par Total blocks -8\par \par Specimen was received and underwent gross examination at [facility], [address] [place] [address],\[place] [address].\par \par 07/14/2021 09:12:18 EDT mt\par \par \par \par \par \par \par LEVENTHAL, AMBER                   2\par \par \par \par Surgical Final Report\par \par \par \par \par Perioperative Diagnosis\par Right intraductal papilloma\par \par  \par

## 2021-07-25 NOTE — REVIEW OF SYSTEMS
[Abn Vaginal Bleeding] : unexplained vaginal bleeding [As Noted in HPI] : as noted in HPI [Hot Flashes] : hot flashes [Negative] : Heme/Lymph [Skin Lesions] : no skin lesions [Skin Wound] : skin wound [Breast Pain] : no breast pain [Breast Lump] : no breast lump

## 2021-07-25 NOTE — PHYSICAL EXAM
[Normocephalic] : normocephalic [Atraumatic] : atraumatic [EOMI] : extra ocular movement intact [No Supraclavicular Adenopathy] : no supraclavicular adenopathy [No Cervical Adenopathy] : no cervical adenopathy [No dominant masses] : no dominant masses in right breast  [No dominant masses] : no dominant masses left breast [No Nipple Retraction] : no left nipple retraction [No Nipple Discharge] : no left nipple discharge [No Axillary Lymphadenopathy] : no left axillary lymphadenopathy [Soft] : abdomen soft [Not Tender] : non-tender [No Edema] : no edema [No Rashes] : no rashes [No Ulceration] : no ulceration [Examined in the supine and seated position] : examined in the supine and seated position [de-identified] : no suspicious abnormalities were palpated within either breast  [de-identified] :  surgical incision is healing well without any signs of infection, seroma or hematoma formation

## 2021-07-25 NOTE — ASSESSMENT
[FreeTextEntry1] : Rosemarie is a 55 perimenopausal F with a R intraductal papilloma, s/p R WLE on 7/13/2021 and benign high risk disease.\par \par 7/13/2021 -- R WLE \par -intraductal papilloma \par -proliferative type fibrocystic changes \par \par On exam, her surgical incision in her right breast is healing well without any signs of infection. \par \par Her final pathology revealed an intraductal papilloma and fibrocystic breast changes.  No further surgical intervention is indicated. \par \par She will be due for a b/l dx mammogram and US On 1/29/2022.  THis will be scheduled for her today.  I will have her follow up after for a CBE. \par \par AS REVIEW:\par Her most recent imaging was a b/l screening mammogram and b/l dx mammogram and US On 1/29/2021 and 3/2/2021 respectively which revealed in her right breast, central to slightly inner breast, an 8 mm biopsy proven intraductal papilloma, and in her left breast, medial, biopsy proven benign nodular asymmetry with 2 adjacent 3-4 mm nodular asymmetries. \par \par Intraductal papillomas without atypia are considered fibroproliferative lesions without atypia.  Patients with these lesions were found to have a slightly increased relative risk of breast cancer compared to the reference population.  However the lesions themselves do not have any malignant potential. \par \par Although newer studies regarding the upgrade rate (to cancer) ranges from 0-14% on surgical excision, with pathologic/radiologic concordance, older studies found a higher upgrade rate.  I have recommended surgical excision for this reason.  Because it is not readily palpable, I will have her undergo a preoperative radiofrequency tag localization.  \par \par We discussed dense breasts.  Increasing breast density has been found to increase ones risk of breast cancer, but at this time, there is no clear indication for additional imaging in this setting, as both US and MRI have not been found to improve survival.  One can consider bilateral screening US.  However, out of 1000 women screened, the use of routine US will only identify an additional 3-5 cancers.  The use of US was found to increase the likelihood of undergoing more imaging and more biopsies.  She does have dense breasts.  We have decided to proceed with screening bilateral breast US at this time.  This will be scheduled with her next screening mammogram.\par \par In regards to her family history of breast cancer in her mother, maternal grandmother and sister, her risk assessment is as follows: \par RISK ASSESSMENT: \par Jaclyn\par 5yr -- 5.2%\par lifetime -- 31.5%\par \par TC v6\par 5yr -- 5.9%\par lifetime -- 25.8%\par \par This puts her in the high risk category for breast cancer because she has a lifetime risk of >20%.  She qualifies for annual screening MRIs which would be done in an alternating fashion with her screening mammograms such that an imaging study and clinical breast exam would be performed every 6 months.  The use of MRIs have not been shown to prolong survival, however out of 1000 women screened, an additional 14-15 cancers will be identified.  The use of MRIs, has, however, been shown to increase the number of procedures and additional imaging because although it is a very sensitive test, it is not as specific.  This was discussed with the patient and she would like to proceed with screening MRIs.  Her next breast MRI will be due in 1 year (7/2022). \par \par In addition, because her 5yr risk exceeds 1.7%, she also qualifies for chemopreventative medications.  For premenopausal women, we can offer tamoxifen 20 mg daily for 5 years and for postmenopausal women, we can offer either tamoxifen or raloxifene x 5 years.  This medication has been found to reduce the risk of breast cancer by about 50%.  The risks associated with these medications include thromboembolic disease, uterine cancer, and cataracts, as well as some side effects including hot flashes, vasomotor symptoms, weight gain or hair thinning/loss.  She is not interested in taking any additional medications at this time. \par \par All of her questions were answered.  She knows to call with any further questions or concerns. \par \par PLAN:\par -b/l dx mammogram and US on 1/29/2022\par -f/up after \par -breast MRI due on 7/29/2022

## 2022-02-21 ENCOUNTER — RESULT REVIEW (OUTPATIENT)
Age: 57
End: 2022-02-21

## 2022-02-21 ENCOUNTER — OUTPATIENT (OUTPATIENT)
Dept: OUTPATIENT SERVICES | Facility: HOSPITAL | Age: 57
LOS: 1 days | Discharge: HOME | End: 2022-02-21
Payer: COMMERCIAL

## 2022-02-21 DIAGNOSIS — Z98.891 HISTORY OF UTERINE SCAR FROM PREVIOUS SURGERY: Chronic | ICD-10-CM

## 2022-02-21 DIAGNOSIS — R92.8 OTHER ABNORMAL AND INCONCLUSIVE FINDINGS ON DIAGNOSTIC IMAGING OF BREAST: ICD-10-CM

## 2022-02-21 DIAGNOSIS — Z90.49 ACQUIRED ABSENCE OF OTHER SPECIFIED PARTS OF DIGESTIVE TRACT: Chronic | ICD-10-CM

## 2022-02-21 PROCEDURE — 76641 ULTRASOUND BREAST COMPLETE: CPT | Mod: 26,50

## 2022-02-21 PROCEDURE — G0279: CPT | Mod: 26

## 2022-02-21 PROCEDURE — 77062 BREAST TOMOSYNTHESIS BI: CPT | Mod: 26

## 2022-02-21 PROCEDURE — 77066 DX MAMMO INCL CAD BI: CPT | Mod: 26

## 2022-02-24 ENCOUNTER — APPOINTMENT (OUTPATIENT)
Dept: BREAST CENTER | Facility: CLINIC | Age: 57
End: 2022-02-24

## 2022-08-26 NOTE — BRIEF OPERATIVE NOTE - NSEVIDENCEINFORABS_GEN_ALL_CORE
----- Message from Vladimir Khankaylaluan sent at 8/25/2022  3:46 PM CDT -----  Regarding: Dr. Tsai's office calling      The caller Christ Bal with Dr. Tsai's office requesting a call bk with the Gastric Pace Make setting.    The caller requesting that you fax the info over as they are about to close for the day to -    Office Fax #[phone] (beau Sinclair)    Office Ph # [phone]      
Dr Lehman,   Please advise.   Anitha  
MD Andreia Sánchez MA  Caller: Unspecified (Yesterday,  6:12 PM)  I am on vacation now for a while   But I think the last setting was amp 5.5   And 3 seconds on, 2 seconds off     
No

## 2023-03-28 NOTE — PRE-ANESTHESIA EVALUATION ADULT - NSDENTALSD_ENT_ALL_CORE
appears normal and intact Dapsone Counseling: I discussed with the patient the risks of dapsone including but not limited to hemolytic anemia, agranulocytosis, rashes, methemoglobinemia, kidney failure, peripheral neuropathy, headaches, GI upset, and liver toxicity.  Patients who start dapsone require monitoring including baseline LFTs and weekly CBCs for the first month, then every month thereafter.  The patient verbalized understanding of the proper use and possible adverse effects of dapsone.  All of the patient's questions and concerns were addressed.

## 2024-07-26 NOTE — BRIEF OPERATIVE NOTE - ANTIBIOTIC PROTOCOL
Nasal Root Units/Cc: 0 Detail Level: Detailed ancef/Followed protocol Document As Units Or Cc?: units Post-Care Instructions: Patient instructed to not lie down for 4 hours and limit physical activity for 24 hours. Dilution (U/0.1 Cc): 4 Consent: Written consent obtained. Risks include but not limited to lid/brow ptosis, bruising, swelling, diplopia, temporary effect, incomplete chemical denervation. Including Pricing Information In The Note: No Quantity Per Injection Site (Units Or Cc): 2 U Quantity Per Injection Site (Units Or Cc): 3 U done

## 2025-01-30 ENCOUNTER — APPOINTMENT (OUTPATIENT)
Dept: PULMONOLOGY | Facility: CLINIC | Age: 60
End: 2025-01-30

## 2025-05-15 NOTE — ASU DISCHARGE PLAN (ADULT/PEDIATRIC) - CLICK TO LAUNCH ORM
Podiatric Post Operative Instructions:  You have had a surgical procedure on your left foot.      Fluids and Diet:  Begin with clear liquids, broth, dry toast, and crackers.  If not nauseated then resume your regular pre-operative diet when you are ready    Medications:  Take your prescriptions as directed  If your pain is not severe then you may take the non-prescription medication that you normally take for aches and pains  You may resume your regularly scheduled medications (unless otherwise directed)  If any side effects or adverse reactions occur, discontinue the medication and contact your doctor.  Review the patient drug information that is provided before you take any medication    Ambulation and Activity:  You are advised to go directly home from the hospital  Use surgical shoe for protection  You may not put weight on the operated foot.  You should wear the surgical shoe at all times when awake.  Avoid stairs if possible.  Do not lift or move heavy objects  Do not drive until cleared by your physician    Bandage and Wound Care Instructions:  Keep bandage clean and dry  Do not shower or bathe the operative extremity  Do not remove the bandage (unless otherwise directed)   Do not attempt to put anything between the cast or dressing and your skin, some itching is normal.    Ice and Elevation:  Elevate operative extremity as much as possible to reduce swelling and discomfort.  Elevate with 2 pillows at or above the level of the heart for the first 72 hours.  Ice:  Apply Hospital dispensed insulated ice bag over the bandage 20 minutes of every hour while awake for the first 72 hours.  You may behind the knee as well.    Special Instructions: Call your doctor immediately if you develop any of the following.  Fever over 100 degrees by mouth - take your temperature daily until your first follow up visit.  Pain not relieved by medication ordered  Swelling, increased redness, warmth, or hardness around operative  area.  Numb, tingling or cold toes.  Toe(s) become white or bluish  Bandage becomes wet, soiled, or blood soaked (small amount of bleeding may be normal)  Increased or progressive drainage from surgical area.    Follow up instructions:  You will need to follow up with Dr. Magdaleno at your scheduled appointment.   Call  when you get home to make an appointment.  Call your Podiatrist office if you have any questions or concerns.      <--- Click to Launch ICDx for PreOp, PostOp and Procedure .